# Patient Record
Sex: MALE | Race: WHITE | URBAN - METROPOLITAN AREA
[De-identification: names, ages, dates, MRNs, and addresses within clinical notes are randomized per-mention and may not be internally consistent; named-entity substitution may affect disease eponyms.]

---

## 2024-02-20 ENCOUNTER — TELEPHONE (OUTPATIENT)
Age: 64
End: 2024-02-20

## 2024-02-20 NOTE — TELEPHONE ENCOUNTER
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 Provided patient with a few popular providers and he is going to do some research and will call back if he decides to schedule.

## 2024-03-27 ENCOUNTER — OFFICE VISIT (OUTPATIENT)
Dept: SURGERY | Facility: CLINIC | Age: 64
End: 2024-03-27
Payer: COMMERCIAL

## 2024-03-27 VITALS
OXYGEN SATURATION: 98 % | HEIGHT: 72 IN | TEMPERATURE: 97.6 F | WEIGHT: 179.8 LBS | DIASTOLIC BLOOD PRESSURE: 88 MMHG | SYSTOLIC BLOOD PRESSURE: 134 MMHG | BODY MASS INDEX: 24.35 KG/M2 | HEART RATE: 83 BPM | RESPIRATION RATE: 18 BRPM

## 2024-03-27 DIAGNOSIS — K40.90 NON-RECURRENT UNILATERAL INGUINAL HERNIA WITHOUT OBSTRUCTION OR GANGRENE: Primary | ICD-10-CM

## 2024-03-27 PROCEDURE — 99203 OFFICE O/P NEW LOW 30 MIN: CPT | Performed by: SURGERY

## 2024-03-28 PROBLEM — K40.90 NON-RECURRENT UNILATERAL INGUINAL HERNIA WITHOUT OBSTRUCTION OR GANGRENE: Status: ACTIVE | Noted: 2024-03-28

## 2024-03-28 RX ORDER — SODIUM CHLORIDE, SODIUM LACTATE, POTASSIUM CHLORIDE, CALCIUM CHLORIDE 600; 310; 30; 20 MG/100ML; MG/100ML; MG/100ML; MG/100ML
125 INJECTION, SOLUTION INTRAVENOUS CONTINUOUS
OUTPATIENT
Start: 2024-05-30

## 2024-03-28 NOTE — ASSESSMENT & PLAN NOTE
He has a reducible left inguinal hernia.  Discussed options we will plan for robotic or laparoscopic repair of his left inguinal hernia.  The risks benefits and alternatives were explained to him is agreeable to proceed.

## 2024-03-28 NOTE — PROGRESS NOTES
Assessment/Plan:    Non-recurrent unilateral inguinal hernia without obstruction or gangrene  He has a reducible left inguinal hernia.  Discussed options we will plan for robotic or laparoscopic repair of his left inguinal hernia.  The risks benefits and alternatives were explained to him is agreeable to proceed.       Diagnoses and all orders for this visit:    Non-recurrent unilateral inguinal hernia without obstruction or gangrene  -     Case request operating room: REPAIR HERNIA INGUINAL LAPAROSCOPIC W/ ROBOTICS; Standing  -     EKG 12 lead; Future  -     Case request operating room: REPAIR HERNIA INGUINAL LAPAROSCOPIC W/ ROBOTICS    Other orders  -     Diet NPO; Sips with meds; Standing  -     Apply Sequential Compression Device; Standing  -     Place sequential compression device; Standing  -     lactated ringers infusion  -     Reason for no Mechanical VTE Prophylaxis; Standing  -     ceFAZolin (ANCEF) 2,000 mg in dextrose 5 % 100 mL IVPB          Subjective:      Patient ID: Justice Ruffin is a 63 y.o. male.    Mr. Ruffin is a 63-year-old gentleman here for evaluation of a possible left inguinal hernia.  Patient cannot member specific event but started having pain in his left groin after work.  He has pain left groin and has some swelling.  He feels the lump is getting larger since he first noticed it.  He is feels it is more the size of a tennis ball now.  When he pushes back in he feels some gurgling and concerned about it containing his intestine.  He denies nausea vomit fevers or chills.  He denies hernia surgery in the past.  He has a very physical job.  Denies history of abdominal surgeries in the past.        The following portions of the patient's history were reviewed and updated as appropriate: allergies, current medications, past family history, past medical history, past social history, past surgical history, and problem list.    Review of Systems   Constitutional:  Negative for chills and  fever.   HENT:  Negative for ear pain and sore throat.    Eyes:  Negative for pain and visual disturbance.   Respiratory:  Negative for cough and shortness of breath.    Cardiovascular:  Negative for chest pain and palpitations.   Gastrointestinal:  Negative for abdominal pain and vomiting.   Musculoskeletal:  Negative for arthralgias and back pain.   Skin:  Negative for color change and rash.   Neurological:  Negative for seizures and syncope.   Psychiatric/Behavioral:  Negative for agitation and behavioral problems.    All other systems reviewed and are negative.        Objective:      /88 (BP Location: Right arm, Patient Position: Sitting, Cuff Size: Large)   Pulse 83   Temp 97.6 °F (36.4 °C) (Temporal)   Resp 18   Ht 6' (1.829 m)   Wt 81.6 kg (179 lb 12.8 oz)   SpO2 98%   BMI 24.39 kg/m²          Physical Exam  Vitals and nursing note reviewed.   Constitutional:       Appearance: Normal appearance.   Cardiovascular:      Rate and Rhythm: Normal rate and regular rhythm.   Pulmonary:      Effort: Pulmonary effort is normal.      Breath sounds: Normal breath sounds.   Abdominal:      General: There is no distension.      Palpations: Abdomen is soft. There is no mass.      Tenderness: There is no abdominal tenderness.      Comments: Reducible left inguinal hernia   Neurological:      Mental Status: He is alert.   Psychiatric:         Mood and Affect: Mood normal.         Behavior: Behavior normal.

## 2024-05-16 ENCOUNTER — APPOINTMENT (OUTPATIENT)
Dept: LAB | Facility: HOSPITAL | Age: 64
End: 2024-05-16
Payer: COMMERCIAL

## 2024-05-16 DIAGNOSIS — K40.90 NON-RECURRENT UNILATERAL INGUINAL HERNIA WITHOUT OBSTRUCTION OR GANGRENE: ICD-10-CM

## 2024-05-16 LAB
ATRIAL RATE: 88 BPM
P AXIS: 38 DEGREES
PR INTERVAL: 164 MS
QRS AXIS: 35 DEGREES
QRSD INTERVAL: 100 MS
QT INTERVAL: 344 MS
QTC INTERVAL: 416 MS
T WAVE AXIS: 50 DEGREES
VENTRICULAR RATE: 88 BPM

## 2024-05-16 PROCEDURE — 93005 ELECTROCARDIOGRAM TRACING: CPT

## 2024-05-16 PROCEDURE — 93010 ELECTROCARDIOGRAM REPORT: CPT | Performed by: INTERNAL MEDICINE

## 2024-05-21 ENCOUNTER — TELEPHONE (OUTPATIENT)
Age: 64
End: 2024-05-21

## 2024-05-21 NOTE — PRE-PROCEDURE INSTRUCTIONS
Pre-Surgery Instructions:   Medication Instructions    Zinc Sulfate (ZINC 15 PO) Hold day of surgery.    Medication instructions for day surgery reviewed. Please use only a sip of water to take your instructed medications. Avoid all over the counter vitamins, supplements and NSAIDS for one week prior to surgery per anesthesia guidelines. Tylenol is ok to take as needed.     You will receive a call one business day prior to surgery with an arrival time and hospital directions. If your surgery is scheduled on a Monday, the hospital will be calling you on the Friday prior to your surgery. If you have not heard from anyone by 8pm, please call the hospital supervisor through the hospital  at [phone]. ([place] [phone] or [place] [phone]).    Do not eat or drink anything after midnight the night before your surgery, including candy, mints, lifesavers, or chewing gum. Do not drink alcohol 24hrs before your surgery. Try not to smoke at least 24hrs before your surgery.       Follow the pre surgery showering instructions as listed in the “My Surgical Experience Booklet” or otherwise provided by your surgeon's office. Do not use a blade to shave the surgical area 1 week before surgery. It is okay to use a clean electric clippers up to 24 hours before surgery. Do not apply any lotions, creams, including makeup, cologne, deodorant, or perfumes after showering on the day of your surgery. Do not use dry shampoo, hair spray, hair gel, or any type of hair products.     No contact lenses, eye make-up, or artificial eyelashes. Remove nail polish, including gel polish, and any artificial, gel, or acrylic nails if possible. Remove all jewelry including rings and body piercing jewelry.     Wear causal clothing that is easy to take on and off. Consider your type of surgery.    Keep any valuables, jewelry, piercings at home. Please bring any specially ordered equipment (sling, braces) if indicated.    Arrange for a  responsible person to drive you to and from the hospital on the day of your surgery. Please confirm the visitor policy for the day of your procedure when you receive your phone call with an arrival time.     Call the surgeon's office with any new illnesses, exposures, or additional questions prior to surgery.    Please reference your “My Surgical Experience Booklet” for additional information to prepare for your upcoming surgery.

## 2024-05-29 ENCOUNTER — ANESTHESIA EVENT (OUTPATIENT)
Dept: PERIOP | Facility: HOSPITAL | Age: 64
End: 2024-05-29
Payer: COMMERCIAL

## 2024-05-30 ENCOUNTER — HOSPITAL ENCOUNTER (OUTPATIENT)
Facility: HOSPITAL | Age: 64
Setting detail: OUTPATIENT SURGERY
Discharge: HOME | End: 2024-05-30
Attending: SURGERY | Admitting: SURGERY
Payer: COMMERCIAL

## 2024-05-30 ENCOUNTER — ANESTHESIA (OUTPATIENT)
Dept: PERIOP | Facility: HOSPITAL | Age: 64
End: 2024-05-30
Payer: COMMERCIAL

## 2024-05-30 VITALS
SYSTOLIC BLOOD PRESSURE: 147 MMHG | OXYGEN SATURATION: 99 % | HEIGHT: 72 IN | HEART RATE: 75 BPM | TEMPERATURE: 97.9 F | BODY MASS INDEX: 23.56 KG/M2 | DIASTOLIC BLOOD PRESSURE: 94 MMHG | WEIGHT: 173.94 LBS | RESPIRATION RATE: 18 BRPM

## 2024-05-30 PROCEDURE — NC001 PR NO CHARGE: Performed by: SURGERY

## 2024-05-30 PROCEDURE — C1781 MESH (IMPLANTABLE): HCPCS | Performed by: SURGERY

## 2024-05-30 PROCEDURE — S2900 ROBOTIC SURGICAL SYSTEM: HCPCS | Performed by: SURGERY

## 2024-05-30 PROCEDURE — NC001 PR NO CHARGE: Performed by: PHYSICIAN ASSISTANT

## 2024-05-30 PROCEDURE — 49650 LAP ING HERNIA REPAIR INIT: CPT | Performed by: SURGERY

## 2024-05-30 DEVICE — 3DMAX™ MID ANATOMICAL MESH, XL, LEFT, 5” X 7”, 12 X 17 CM
Type: IMPLANTABLE DEVICE | Status: FUNCTIONAL
Brand: 3DMAX™ MID ANATOMICAL MESH

## 2024-05-30 RX ORDER — OXYCODONE HYDROCHLORIDE 5 MG/1
5 TABLET ORAL EVERY 4 HOURS PRN
Status: DISCONTINUED | OUTPATIENT
Start: 2024-05-30 | End: 2024-05-30 | Stop reason: HOSPADM

## 2024-05-30 RX ORDER — LIDOCAINE HYDROCHLORIDE 20 MG/ML
INJECTION, SOLUTION EPIDURAL; INFILTRATION; INTRACAUDAL; PERINEURAL AS NEEDED
Status: DISCONTINUED | OUTPATIENT
Start: 2024-05-30 | End: 2024-05-30

## 2024-05-30 RX ORDER — MIDAZOLAM HYDROCHLORIDE 2 MG/2ML
INJECTION, SOLUTION INTRAMUSCULAR; INTRAVENOUS AS NEEDED
Status: DISCONTINUED | OUTPATIENT
Start: 2024-05-30 | End: 2024-05-30

## 2024-05-30 RX ORDER — ONDANSETRON 2 MG/ML
INJECTION INTRAMUSCULAR; INTRAVENOUS AS NEEDED
Status: DISCONTINUED | OUTPATIENT
Start: 2024-05-30 | End: 2024-05-30

## 2024-05-30 RX ORDER — CEFAZOLIN SODIUM 2 G/50ML
2000 SOLUTION INTRAVENOUS ONCE
Status: COMPLETED | OUTPATIENT
Start: 2024-05-30 | End: 2024-05-30

## 2024-05-30 RX ORDER — ROCURONIUM BROMIDE 10 MG/ML
INJECTION, SOLUTION INTRAVENOUS AS NEEDED
Status: DISCONTINUED | OUTPATIENT
Start: 2024-05-30 | End: 2024-05-30

## 2024-05-30 RX ORDER — ACETAMINOPHEN 325 MG/1
650 TABLET ORAL EVERY 6 HOURS PRN
Status: DISCONTINUED | OUTPATIENT
Start: 2024-05-30 | End: 2024-05-30 | Stop reason: HOSPADM

## 2024-05-30 RX ORDER — ONDANSETRON 2 MG/ML
4 INJECTION INTRAMUSCULAR; INTRAVENOUS ONCE AS NEEDED
Status: DISCONTINUED | OUTPATIENT
Start: 2024-05-30 | End: 2024-05-30 | Stop reason: HOSPADM

## 2024-05-30 RX ORDER — SODIUM CHLORIDE 9 MG/ML
125 INJECTION, SOLUTION INTRAVENOUS CONTINUOUS
Status: DISCONTINUED | OUTPATIENT
Start: 2024-05-30 | End: 2024-05-30 | Stop reason: HOSPADM

## 2024-05-30 RX ORDER — MAGNESIUM HYDROXIDE 1200 MG/15ML
LIQUID ORAL AS NEEDED
Status: DISCONTINUED | OUTPATIENT
Start: 2024-05-30 | End: 2024-05-30 | Stop reason: HOSPADM

## 2024-05-30 RX ORDER — SODIUM CHLORIDE, SODIUM LACTATE, POTASSIUM CHLORIDE, CALCIUM CHLORIDE 600; 310; 30; 20 MG/100ML; MG/100ML; MG/100ML; MG/100ML
125 INJECTION, SOLUTION INTRAVENOUS CONTINUOUS
Status: DISCONTINUED | OUTPATIENT
Start: 2024-05-30 | End: 2024-05-30 | Stop reason: HOSPADM

## 2024-05-30 RX ORDER — SODIUM CHLORIDE, SODIUM LACTATE, POTASSIUM CHLORIDE, CALCIUM CHLORIDE 600; 310; 30; 20 MG/100ML; MG/100ML; MG/100ML; MG/100ML
INJECTION, SOLUTION INTRAVENOUS CONTINUOUS PRN
Status: DISCONTINUED | OUTPATIENT
Start: 2024-05-30 | End: 2024-05-30

## 2024-05-30 RX ORDER — FENTANYL CITRATE/PF 50 MCG/ML
50 SYRINGE (ML) INJECTION
Status: DISCONTINUED | OUTPATIENT
Start: 2024-05-30 | End: 2024-05-30 | Stop reason: HOSPADM

## 2024-05-30 RX ORDER — DEXAMETHASONE SODIUM PHOSPHATE 10 MG/ML
INJECTION, SOLUTION INTRAMUSCULAR; INTRAVENOUS AS NEEDED
Status: DISCONTINUED | OUTPATIENT
Start: 2024-05-30 | End: 2024-05-30

## 2024-05-30 RX ORDER — FENTANYL CITRATE 50 UG/ML
INJECTION, SOLUTION INTRAMUSCULAR; INTRAVENOUS AS NEEDED
Status: DISCONTINUED | OUTPATIENT
Start: 2024-05-30 | End: 2024-05-30

## 2024-05-30 RX ORDER — BUPIVACAINE HYDROCHLORIDE AND EPINEPHRINE 2.5; 5 MG/ML; UG/ML
INJECTION, SOLUTION EPIDURAL; INFILTRATION; INTRACAUDAL; PERINEURAL AS NEEDED
Status: DISCONTINUED | OUTPATIENT
Start: 2024-05-30 | End: 2024-05-30 | Stop reason: HOSPADM

## 2024-05-30 RX ORDER — PROPOFOL 10 MG/ML
INJECTION, EMULSION INTRAVENOUS AS NEEDED
Status: DISCONTINUED | OUTPATIENT
Start: 2024-05-30 | End: 2024-05-30

## 2024-05-30 RX ADMIN — ROCURONIUM BROMIDE 50 MG: 50 INJECTION, SOLUTION INTRAVENOUS at 10:24

## 2024-05-30 RX ADMIN — FENTANYL CITRATE 50 MCG: 50 INJECTION INTRAMUSCULAR; INTRAVENOUS at 10:57

## 2024-05-30 RX ADMIN — ROCURONIUM BROMIDE 20 MG: 50 INJECTION, SOLUTION INTRAVENOUS at 10:58

## 2024-05-30 RX ADMIN — PROPOFOL 200 MG: 10 INJECTION, EMULSION INTRAVENOUS at 10:24

## 2024-05-30 RX ADMIN — ONDANSETRON 4 MG: 2 INJECTION INTRAMUSCULAR; INTRAVENOUS at 12:29

## 2024-05-30 RX ADMIN — CEFAZOLIN SODIUM 2000 MG: 2 SOLUTION INTRAVENOUS at 10:40

## 2024-05-30 RX ADMIN — PROPOFOL 30 MG: 10 INJECTION, EMULSION INTRAVENOUS at 10:30

## 2024-05-30 RX ADMIN — FENTANYL CITRATE 50 MCG: 50 INJECTION INTRAMUSCULAR; INTRAVENOUS at 10:24

## 2024-05-30 RX ADMIN — FENTANYL CITRATE 50 MCG: 50 INJECTION INTRAMUSCULAR; INTRAVENOUS at 11:06

## 2024-05-30 RX ADMIN — SODIUM CHLORIDE 125 ML/HR: 0.9 INJECTION, SOLUTION INTRAVENOUS at 08:41

## 2024-05-30 RX ADMIN — SUGAMMADEX 200 MG: 100 INJECTION, SOLUTION INTRAVENOUS at 12:36

## 2024-05-30 RX ADMIN — MIDAZOLAM 2 MG: 1 INJECTION INTRAMUSCULAR; INTRAVENOUS at 10:14

## 2024-05-30 RX ADMIN — FENTANYL CITRATE 50 MCG: 50 INJECTION INTRAMUSCULAR; INTRAVENOUS at 12:19

## 2024-05-30 RX ADMIN — SODIUM CHLORIDE, SODIUM LACTATE, POTASSIUM CHLORIDE, AND CALCIUM CHLORIDE: .6; .31; .03; .02 INJECTION, SOLUTION INTRAVENOUS at 10:23

## 2024-05-30 RX ADMIN — ROCURONIUM BROMIDE 10 MG: 50 INJECTION, SOLUTION INTRAVENOUS at 12:24

## 2024-05-30 RX ADMIN — SODIUM CHLORIDE, SODIUM LACTATE, POTASSIUM CHLORIDE, AND CALCIUM CHLORIDE: .6; .31; .03; .02 INJECTION, SOLUTION INTRAVENOUS at 12:41

## 2024-05-30 RX ADMIN — LIDOCAINE HYDROCHLORIDE 80 MG: 20 INJECTION, SOLUTION EPIDURAL; INFILTRATION; INTRACAUDAL at 10:24

## 2024-05-30 RX ADMIN — DEXAMETHASONE SODIUM PHOSPHATE 10 MG: 10 INJECTION INTRAMUSCULAR; INTRAVENOUS at 10:40

## 2024-05-30 RX ADMIN — ROCURONIUM BROMIDE 10 MG: 50 INJECTION, SOLUTION INTRAVENOUS at 12:05

## 2024-05-30 RX ADMIN — FENTANYL CITRATE 50 MCG: 50 INJECTION INTRAMUSCULAR; INTRAVENOUS at 13:06

## 2024-05-30 RX ADMIN — FENTANYL CITRATE 50 MCG: 50 INJECTION INTRAMUSCULAR; INTRAVENOUS at 13:12

## 2024-05-30 RX ADMIN — ROCURONIUM BROMIDE 20 MG: 50 INJECTION, SOLUTION INTRAVENOUS at 11:28

## 2024-05-30 NOTE — ANESTHESIA POSTPROCEDURE EVALUATION
Post-Op Assessment Note    CV Status:  Stable    Pain management: adequate    Multimodal analgesia used between 6 hours prior to anesthesia start to PACU discharge    Mental Status:  Awake   Hydration Status:  Stable   PONV Controlled:  Controlled   Airway Patency:  Patent  Two or more mitigation strategies used for obstructive sleep apnea   Post Op Vitals Reviewed: Yes    No anethesia notable event occurred.    Staff: CRNA               BP      Temp      Pulse     Resp      SpO2

## 2024-05-30 NOTE — OP NOTE
OPERATIVE REPORT  PATIENT NAME: Justice Ruffin    :  1960  MRN: [id number]  Pt Location: [facility] OR ROOM 08    SURGERY DATE: 2024    Surgeons and Role:     * Benton Pop MD - Primary     * Maria E Jolley PA-C - Assisting     * Shayne Bang MD - Assisting    Preop Diagnosis:  Non-recurrent unilateral inguinal hernia without obstruction or gangrene K40.90    Post-Op Diagnosis Codes:     * Non-recurrent unilateral inguinal hernia without obstruction or gangrene [K40.90]    Procedure(s):  Left - REPAIR HERNIA INGUINAL  W/ ROBOTICS    Specimen(s):  * No specimens in log *    Estimated Blood Loss:   Minimal    Drains:  Urethral Catheter Latex 16 Fr. (Active)   Number of days: 0       Anesthesia Type:   General    Operative Indications:  Non-recurrent unilateral inguinal hernia without obstruction or gangrene K40.90      Operative Findings:  Large sliding left indirect inguinal hernia containing colon    No evidence of right inguinal hernia  Complications:   None    Procedure and Technique:  The patient was seen again in the Holding Room. The risks, benefits, complications, treatment options, and expected outcomes were discussed with the patient. The possibilities of reaction to medication, pulmonary aspiration, perforation of viscus, bleeding, postoperative short or long term nerve entrapment causing pain,recurrent infection, the need for additional procedures, and development of a complication requiring transfusion or further operation were discussed with the patient and/or family. There was concurrence with the proposed plan, and informed consent was obtained. The site of surgery was properly noted/marked. The patient was taken to the Operating Room, identified as Justice Ruffin and the procedure verified as hernia repair. A Time Out was held and the above information confirmed.    The patient was prepped and draped in a sterile fashion.  A timeout was again performed.  Local anesthesia was used in the  incision. An periumbilical incision was made.  Dissection carried out to the fascia which was grasped with Kocher's and elevated. The fascia was incised an 8 mm trocar was placed in under direct visualization. The abdomen was inflated and the camera was placed in.. Two8 mm trocars were placed lateral to rectus muscle approximately at the level of the umbilicus.  At this point the patient was placed into Trendelenburg position and the robot was docked and the instruments were placed in.  The patient was noted to have left inguinal hernia .   The peritoneum was incised from the medial umbilical fold out laterally past the internal ring on the left.  Next the direct space was mobilized by exposing Jaswinder's ligament all the way along its length to the pubic tubercle. There was indirect hernia sac this was carefully mobilized off the cord structures with care to avoid injury to the gonadal vessels or spermatic cord. The remainder of the inferior flap was created. At this point  Bard 3D max mesh was selected and placed into the preperitoneal space. The mesh was deployed and placed in the appropriate position.  The edge of the peritoneum was well below the edge of the mesh.  The mesh secured with a 2-0 Vicryl suture at Jaswinder's.  The peritoneum was then sutured closed with the V lock suture.      Now the repair was complete the robot was undocked. The umbilical trocor site was closed with an  0-vicryl using a suture Passer  The wound was closed in multiple layers using 3-0 Vicryl sutures and the skin closed using a 4-0 Monocryl subcuticular stitch. The wound was dressed.  The patient was anatomically correct at the end of the procedure.  The patient tolerated the procedure in good condition.    Instrument, sponge, and needle counts were correct prior to closure and at the conclusion of the case.    The PA was essential for assistance with abdominal access and docking the robot as well as retraction and suturing.      This  text is generated with voice recognition software. There may be translation, syntax,  or grammatical errors. If you have any questions, please contact the dictating provider.      I was present for the entire procedure.    Patient Disposition:  PACU         SIGNATURE: Benton Pop MD  DATE: May 30, 2024  TIME: 12:10 PM

## 2024-05-30 NOTE — ANESTHESIA PREPROCEDURE EVALUATION
Procedure:  REPAIR HERNIA INGUINAL  W/ ROBOTICS (Left: Groin)    Relevant Problems   Other   (+) Non-recurrent unilateral inguinal hernia without obstruction or gangrene        Physical Exam    Airway    Mallampati score: I  TM Distance: >3 FB  Neck ROM: full     Dental   Comment: Missing right upper tooth      Cardiovascular  Cardiovascular exam normal    Pulmonary  Pulmonary exam normal     Other Findings  post-pubertal.      Anesthesia Plan  ASA Score- 2     Anesthesia Type-          Additional Monitors:     Airway Plan: ETT.           Plan Factors-Exercise tolerance (METS): >4 METS.    Chart reviewed. EKG reviewed.  Existing labs reviewed. Patient summary reviewed.    Patient is a current smoker.  Patient instructed to abstain from smoking on day of procedure. Patient did not smoke on day of surgery.    Obstructive sleep apnea risk education given perioperatively.        Induction- intravenous.    Postoperative Plan- Plan for postoperative opioid use.     Perioperative Resuscitation Plan - Level 1 - Full Code.       Informed Consent- Anesthetic plan and risks discussed with patient.

## 2024-05-30 NOTE — DISCHARGE INSTR - AVS FIRST PAGE
[facility]     Post-Operative Care Instructions     Dr. Benton Pop MD, [facility]     [phone]          1. General: You will feel pulling sensations around the wound or funny aches and pains around the incisions. This is normal. Even minor surgery is a change in your body and this is your body’s way of reacting to it. If you have had abdominal surgery, it may help to support the incision with a small pillow or blanket for comfort when moving or coughing.     2. Wound care:  Okay to shower.  The glue will fall off over the next week or 2.   Use ice for at least the 1st 48 hours.  Do not use for longer than 20 minutes at a time. Use 3 times per day.     3. Water: You may shower over the wounds. Do not bathe or use a pool or hot tub until cleared by the physician.   If you were discharged with a drain, make sure drain site is covered with plastic wrap before showering.      4. Activity: You may go up and down stairs, walk as much as you are comfortable, but walk at least 3 times each day. If you have had abdominal surgery, do not lift anything heavier than 20 pounds for at least 4 weeks.      5. Diet: You may resume a regular diet. If you had a same-day surgery or overnight stay surgery, you may wish to eat lightly for a few days: soups, crackers, and sandwiches. You may resume a regular diet when ready.      6. Medications: Resume all of your previous medications, unless told otherwise by the doctor. Avoid aspirin products for 2-3 days after the date of surgery. You may, at that time, began to take them again. Use Tylenol and Ibuprofen for pain control.  You may alternate these medications every 3 hours.  For example: you may take Tylenol at noon, Ibuprofen at 3:00 p.m., and Tylenol again at 6:00 p.m., etc. You should use ice to assist with pain control as above.  You do not need to take narcotic pain medication unless you are having significant pain.   If you were prescribed a  narcotic pain medication containing Tylenol, such as Percocet or Norco, do not use supplemental Tylenol.      7. Driving: You will need someone to drive you home on the day of surgery or discharge. Do not drive or make any important decisions while on narcotic pain medication or 24 hours and after anesthesia or sedation for surgery. Generally, you may drive when your off all narcotic pain medications and you are comfortable.      8. Upset Stomach: You may take Maalox, Tums, or similar items for an upset stomach. If your narcotic pain medication causes an upset stomach, do not take it on an empty stomach. Try taking it with at least some crackers or toast.      9. Constipation: Patients often experience constipation after surgery. You may take over-the-counter medication for this, such as Metamucil, Senokot, Dulcolax, milk of magnesia, etc. You may take a suppository unless you have had anorectal surgery such as a procedure on your hemorrhoids. If you experience significant nausea or vomiting after abdominal surgery, call the office before trying any of these medications.     10. Call the office: If you are experiencing any of the following: fevers above 101.5°, significant nausea or vomiting, if the wound develops drainage and/or there is excessive redness around the wound, or if you have significant diarrhea or other worsening symptoms.     11. Pain: You may be given a prescription for pain medication.  This will be sent to your pharmacy prior to discharge.     12. Sexual Activity: You may resume sexual activity when you feel ready and comfortable and your incision is sealed and healed without apparent infection risk.     13. Urination: If you have not urinated in 6 hours, go directly to the ER for evaluation for urinary retention.      14. Follow-up in 2 weeks.          **READ ONLY IF YOU HAVE BEEN DISCHARGED WITH A URINARY CATHETER**    Angel Insertion for Post-Op Urinary Retention        - A prescription for  Flomax will be sent to your pharmacy.  This should be taken daily while the urinary catheter remains in place.    You will not be given a prescription for Flomax if your prostate has been removed.  If you are already taking Flomax, continue the medication as prescribed.     - We will send a message to the urology group who will contact you within the next 48 hours with further instructions and to schedule an appointment for voiding trial and catheter removal.  The urinary catheter will remain in place for approximately 1 week.  If you are not contacted within the next 48 hours please call our office to assist with scheduling your follow-up.     - If you have your own urologist, you should contact your physician the day after discharge for instructions and to schedule a voiding trial and catheter removal. \

## 2024-05-30 NOTE — H&P
" History & Physical    Justice Ruffin    64 y.o.  male  [id number]  Surgeon:Benton Pop MD  Date: May 30, 2024    Assessment:  Patient Active Problem List   Diagnosis    Non-recurrent unilateral inguinal hernia without obstruction or gangrene     Plan:  Justice Ruffin is scheduled for robotic repair of left inguinal hernia.    HPI    Historical Information   History reviewed. No pertinent past medical history.  History reviewed. No pertinent surgical history.  Social History   Social History     Substance and Sexual Activity   Alcohol Use Yes    Comment: everyday     Social History     Substance and Sexual Activity   Drug Use Yes    Types: Marijuana    Comment: last use 5/28/24     Social History     Tobacco Use   Smoking Status Never   Smokeless Tobacco Not on file     History reviewed. No pertinent family history.     Meds/Allergies   No Known Allergies    Current Facility-Administered Medications:     ceFAZolin (ANCEF) IVPB (premix in dextrose) 2,000 mg 50 mL, 2,000 mg, Intravenous, Once, Benton Pop MD    lactated ringers infusion, 125 mL/hr, Intravenous, Continuous, Benton Pop MD    sodium chloride 0.9 % infusion, 125 mL/hr, Intravenous, Continuous, James Bal DO, Last Rate: 125 mL/hr at 05/30/24 0841, 125 mL/hr at 05/30/24 0841    Review of Systems    Vitals:    05/30/24 0824   BP: 133/89   Pulse: 75   Resp: 16   Temp: 97.6 °F (36.4 °C)   SpO2: 100%     Physical Exam  GEN: NAD, A+OX3   HEENT: Normocephalic, atraumatic,   NECK: Supple, trachea midline,   CARDIAC: regular rate & rhythm, S1 & S2 normal.   LUNGS: Clear to auscultation, No Wheeze, Rales, or Rhonchi  ABDOMEN: Left inguinal hernia  EXTREMITIES: No evidence of cyanosis, clubbing or edema. Pulses +2 B/L LE  NEURO: CN II-XII intact grossly, No sensory or motor deficits    Lab Results: I have personally reviewed pertinent lab results.    Imaging:   EKG, Pathology, and Other Studies:   No results found for: \"GLUCOSE\", \"CALCIUM\", \"NA\", " "\"K\", \"CO2\", \"CL\", \"BUN\", \"CREATININE\"  No results found for: \"WBC\", \"HGB\", \"HCT\", \"MCV\", \"PLT\"  No results found for: \"ALT\", \"AST\", \"GGT\", \"ALKPHOS\", \"BILITOT\"          "

## 2024-06-12 ENCOUNTER — OFFICE VISIT (OUTPATIENT)
Dept: SURGERY | Facility: CLINIC | Age: 64
End: 2024-06-12

## 2024-06-12 VITALS
SYSTOLIC BLOOD PRESSURE: 104 MMHG | WEIGHT: 178 LBS | HEART RATE: 71 BPM | HEIGHT: 72 IN | OXYGEN SATURATION: 95 % | BODY MASS INDEX: 24.11 KG/M2 | RESPIRATION RATE: 16 BRPM | TEMPERATURE: 96.3 F | DIASTOLIC BLOOD PRESSURE: 66 MMHG

## 2024-06-12 DIAGNOSIS — Z98.890 STATUS POST LAPAROSCOPIC HERNIA REPAIR: Primary | ICD-10-CM

## 2024-06-12 DIAGNOSIS — Z87.19 STATUS POST LAPAROSCOPIC HERNIA REPAIR: Primary | ICD-10-CM

## 2024-06-12 PROBLEM — K40.90 NON-RECURRENT UNILATERAL INGUINAL HERNIA WITHOUT OBSTRUCTION OR GANGRENE: Status: RESOLVED | Noted: 2024-03-28 | Resolved: 2024-06-12

## 2024-06-12 PROCEDURE — 99024 POSTOP FOLLOW-UP VISIT: CPT | Performed by: SURGERY

## 2024-06-12 NOTE — PROGRESS NOTES
Assessment/Plan:    Status post laparoscopic hernia repair  Overall he is doing very well.  No complaints.  Postop restrictions were provided.  Follow-up as needed         Diagnoses and all orders for this visit:    Status post laparoscopic hernia repair            Subjective:      Patient ID: Justice Ruffin is a 64 y.o. male.    Mr. Ruffin is a 63-year-old gentleman here for evaluation of a possible left inguinal hernia.  Patient cannot member specific event but started having pain in his left groin after work.  He has pain left groin and has some swelling.  He feels the lump is getting larger since he first noticed it.  He is feels it is more the size of a tennis ball now.  When he pushes back in he feels some gurgling and concerned about it containing his intestine.  He denies nausea vomit fevers or chills.  He denies hernia surgery in the past.  He has a very physical job.  Denies history of abdominal surgeries in the past.    6/12/2024 he is now 2 weeks status post robotic repair of large left indirect sliding inguinal hernia.  Overall doing well.        The following portions of the patient's history were reviewed and updated as appropriate: allergies, current medications, past family history, past medical history, past social history, past surgical history, and problem list.    Review of Systems      Objective:      /66 (BP Location: Left arm, Patient Position: Sitting, Cuff Size: Large)   Pulse 71   Temp (!) 96.3 °F (35.7 °C) (Tympanic)   Resp 16   Ht 6' (1.829 m)   Wt 80.7 kg (178 lb)   SpO2 95%   BMI 24.14 kg/m²          Physical Exam  Abdominal:      Comments: Incisions well-healed.

## 2024-06-12 NOTE — ASSESSMENT & PLAN NOTE
Overall he is doing very well.  No complaints.  Postop restrictions were provided.  Follow-up as needed

## (undated) DEVICE — DRAPE SHEET X-LG

## (undated) DEVICE — PROGRASP FORCEPS: Brand: ENDOWRIST

## (undated) DEVICE — CHLORAPREP HI-LITE 26ML ORANGE

## (undated) DEVICE — SCD SEQUENTIAL COMPRESSION COMFORT SLEEVE MEDIUM KNEE LENGTH: Brand: KENDALL SCD

## (undated) DEVICE — SUT MONOCRYL 4-0 PS-2 27 IN Y426H

## (undated) DEVICE — INTENDED FOR TISSUE SEPARATION, AND OTHER PROCEDURES THAT REQUIRE A SHARP SURGICAL BLADE TO PUNCTURE OR CUT.: Brand: BARD-PARKER SAFETY BLADES SIZE 11, STERILE

## (undated) DEVICE — GLOVE INDICATOR PI UNDERGLOVE SZ 8 BLUE

## (undated) DEVICE — COLUMN DRAPE

## (undated) DEVICE — DISPOSABLE OR TOWEL: Brand: CARDINAL HEALTH

## (undated) DEVICE — PENCIL ELECTROSURG E-Z CLEAN -0035H

## (undated) DEVICE — PMI DISPOSABLE PUNCTURE CLOSURE DEVICE / SUTURE GRASPER: Brand: PMI

## (undated) DEVICE — GLOVE SRG BIOGEL 6.5

## (undated) DEVICE — SUT VICRYL 0 UR-6 27 IN J603H

## (undated) DEVICE — TIP COVER ACCESSORY

## (undated) DEVICE — CANNULA SEAL

## (undated) DEVICE — ARM DRAPE

## (undated) DEVICE — NEEDLE HYPO 22G X 1-1/2 IN

## (undated) DEVICE — GLOVE INDICATOR PI UNDERGLOVE SZ 6.5 BLUE

## (undated) DEVICE — SUT VLOC 90 3-0 V-20 9IN VLOCM0644

## (undated) DEVICE — MEGA SUTURECUT ND: Brand: ENDOWRIST

## (undated) DEVICE — MONOPOLAR CURVED SCISSORS: Brand: ENDOWRIST

## (undated) DEVICE — MAYO STAND COVER: Brand: CONVERTORS

## (undated) DEVICE — ASTOUND STANDARD SURGICAL GOWN, XL: Brand: CONVERTORS

## (undated) DEVICE — TUBE SET SMOKE EVAC PNEUMOCLEAR HIGH FLOW

## (undated) DEVICE — TRAY FOLEY 16FR URIMETER SURESTEP

## (undated) DEVICE — GLOVE SRG BIOGEL ECLIPSE 7.5

## (undated) DEVICE — ALLENTOWN LAP CHOLE APP PACK: Brand: CARDINAL HEALTH

## (undated) DEVICE — ELECTRO LUBE IS A SINGLE PATIENT USE DEVICE THAT IS INTENDED TO BE USED ON ELECTROSURGICAL ELECTRODES TO REDUCE STICKING.: Brand: KEY SURGICAL ELECTRO LUBE

## (undated) DEVICE — EXOFIN PRECISION PEN HIGH VISCOSITY TOPICAL SKIN ADHESIVE: Brand: EXOFIN PRECISION PEN, 1G

## (undated) DEVICE — DRAPE EQUIPMENT RF WAND

## (undated) DEVICE — BLADELESS OBTURATOR: Brand: WECK VISTA

## (undated) DEVICE — SUT VICRYL 2-0 SH 27 IN UNDYED J417H

## (undated) DEVICE — TIBURON LAPAROSCOPIC ABDOMINAL DRAPE: Brand: CONVERTORS